# Patient Record
Sex: FEMALE | Race: WHITE | NOT HISPANIC OR LATINO | Employment: FULL TIME | URBAN - METROPOLITAN AREA
[De-identification: names, ages, dates, MRNs, and addresses within clinical notes are randomized per-mention and may not be internally consistent; named-entity substitution may affect disease eponyms.]

---

## 2023-02-23 LAB
ALANINE AMINOTRANSFERASE (SGPT) (U/L) IN SER/PLAS: 18 U/L (ref 7–45)
ALBUMIN (G/DL) IN SER/PLAS: 4.4 G/DL (ref 3.4–5)
ALKALINE PHOSPHATASE (U/L) IN SER/PLAS: 55 U/L (ref 33–110)
ANION GAP IN SER/PLAS: 12 MMOL/L (ref 10–20)
ASPARTATE AMINOTRANSFERASE (SGOT) (U/L) IN SER/PLAS: 21 U/L (ref 9–39)
BASOPHILS (10*3/UL) IN BLOOD BY AUTOMATED COUNT: 0.04 X10E9/L (ref 0–0.1)
BASOPHILS/100 LEUKOCYTES IN BLOOD BY AUTOMATED COUNT: 1 % (ref 0–2)
BILIRUBIN TOTAL (MG/DL) IN SER/PLAS: 0.4 MG/DL (ref 0–1.2)
CALCIDIOL (25 OH VITAMIN D3) (NG/ML) IN SER/PLAS: 37 NG/ML
CALCIUM (MG/DL) IN SER/PLAS: 9.2 MG/DL (ref 8.6–10.3)
CARBON DIOXIDE, TOTAL (MMOL/L) IN SER/PLAS: 28 MMOL/L (ref 21–32)
CHLORIDE (MMOL/L) IN SER/PLAS: 105 MMOL/L (ref 98–107)
CHOLESTEROL (MG/DL) IN SER/PLAS: 196 MG/DL (ref 0–199)
CHOLESTEROL IN HDL (MG/DL) IN SER/PLAS: 60.1 MG/DL
CHOLESTEROL/HDL RATIO: 3.3
CREATININE (MG/DL) IN SER/PLAS: 1.08 MG/DL (ref 0.5–1.05)
EOSINOPHILS (10*3/UL) IN BLOOD BY AUTOMATED COUNT: 0.07 X10E9/L (ref 0–0.7)
EOSINOPHILS/100 LEUKOCYTES IN BLOOD BY AUTOMATED COUNT: 1.8 % (ref 0–6)
ERYTHROCYTE DISTRIBUTION WIDTH (RATIO) BY AUTOMATED COUNT: 11.8 % (ref 11.5–14.5)
ERYTHROCYTE MEAN CORPUSCULAR HEMOGLOBIN CONCENTRATION (G/DL) BY AUTOMATED: 33.7 G/DL (ref 32–36)
ERYTHROCYTE MEAN CORPUSCULAR VOLUME (FL) BY AUTOMATED COUNT: 94 FL (ref 80–100)
ERYTHROCYTES (10*6/UL) IN BLOOD BY AUTOMATED COUNT: 4.32 X10E12/L (ref 4–5.2)
GFR FEMALE: 59 ML/MIN/1.73M2
GLUCOSE (MG/DL) IN SER/PLAS: 91 MG/DL (ref 74–99)
HEMATOCRIT (%) IN BLOOD BY AUTOMATED COUNT: 40.7 % (ref 36–46)
HEMOGLOBIN (G/DL) IN BLOOD: 13.7 G/DL (ref 12–16)
IMMATURE GRANULOCYTES/100 LEUKOCYTES IN BLOOD BY AUTOMATED COUNT: 0.3 % (ref 0–0.9)
LDL: 115 MG/DL (ref 0–99)
LEUKOCYTES (10*3/UL) IN BLOOD BY AUTOMATED COUNT: 3.8 X10E9/L (ref 4.4–11.3)
LYMPHOCYTES (10*3/UL) IN BLOOD BY AUTOMATED COUNT: 1.37 X10E9/L (ref 1.2–4.8)
LYMPHOCYTES/100 LEUKOCYTES IN BLOOD BY AUTOMATED COUNT: 35.9 % (ref 13–44)
MONOCYTES (10*3/UL) IN BLOOD BY AUTOMATED COUNT: 0.27 X10E9/L (ref 0.1–1)
MONOCYTES/100 LEUKOCYTES IN BLOOD BY AUTOMATED COUNT: 7.1 % (ref 2–10)
NEUTROPHILS (10*3/UL) IN BLOOD BY AUTOMATED COUNT: 2.06 X10E9/L (ref 1.2–7.7)
NEUTROPHILS/100 LEUKOCYTES IN BLOOD BY AUTOMATED COUNT: 53.9 % (ref 40–80)
PLATELETS (10*3/UL) IN BLOOD AUTOMATED COUNT: 263 X10E9/L (ref 150–450)
POTASSIUM (MMOL/L) IN SER/PLAS: 4.1 MMOL/L (ref 3.5–5.3)
PROTEIN TOTAL: 7.1 G/DL (ref 6.4–8.2)
SODIUM (MMOL/L) IN SER/PLAS: 141 MMOL/L (ref 136–145)
THYROTROPIN (MIU/L) IN SER/PLAS BY DETECTION LIMIT <= 0.05 MIU/L: 2.54 MIU/L (ref 0.44–3.98)
THYROXINE (T4) (UG/DL) IN SER/PLAS: 8.6 UG/DL (ref 4.5–11.1)
THYROXINE (T4) FREE INDEX IN SER/PLAS BY CALCULATION: 3.3 (ref 1.6–4.7)
TRIGLYCERIDE (MG/DL) IN SER/PLAS: 103 MG/DL (ref 0–149)
TRIIODOTHYRONINE RESIN UPTAKE (T3RU) % IN SER/PLAS: 38 % (ref 24–41)
UREA NITROGEN (MG/DL) IN SER/PLAS: 17 MG/DL (ref 6–23)
VLDL: 21 MG/DL (ref 0–40)

## 2023-05-11 DIAGNOSIS — E78.2 MIXED HYPERLIPIDEMIA: ICD-10-CM

## 2023-05-11 PROBLEM — E78.5 HYPERLIPIDEMIA: Status: ACTIVE | Noted: 2023-05-11

## 2023-05-11 RX ORDER — EZETIMIBE 10 MG/1
10 TABLET ORAL DAILY
Qty: 90 TABLET | Refills: 1 | Status: SHIPPED | OUTPATIENT
Start: 2023-05-11 | End: 2024-01-23

## 2023-05-11 RX ORDER — EZETIMIBE 10 MG/1
10 TABLET ORAL DAILY
COMMUNITY
End: 2023-05-11 | Stop reason: SDUPTHER

## 2023-06-23 ENCOUNTER — TELEPHONE (OUTPATIENT)
Dept: PRIMARY CARE | Facility: CLINIC | Age: 58
End: 2023-06-23

## 2023-06-23 ENCOUNTER — TELEMEDICINE (OUTPATIENT)
Dept: PRIMARY CARE | Facility: CLINIC | Age: 58
End: 2023-06-23
Payer: COMMERCIAL

## 2023-06-23 DIAGNOSIS — E78.2 MIXED HYPERLIPIDEMIA: ICD-10-CM

## 2023-06-23 DIAGNOSIS — J40 BRONCHITIS: Primary | ICD-10-CM

## 2023-06-23 DIAGNOSIS — U07.1 COVID-19: ICD-10-CM

## 2023-06-23 PROBLEM — M20.40 HAMMERTOE: Status: ACTIVE | Noted: 2023-06-23

## 2023-06-23 PROBLEM — E03.9 HYPOTHYROIDISM: Status: ACTIVE | Noted: 2023-06-23

## 2023-06-23 PROBLEM — M89.8X1 PAIN OF RIGHT CLAVICLE: Status: ACTIVE | Noted: 2023-06-23

## 2023-06-23 PROBLEM — M20.12 ACQUIRED HALLUX VALGUS OF LEFT FOOT: Status: ACTIVE | Noted: 2023-06-23

## 2023-06-23 PROCEDURE — 99214 OFFICE O/P EST MOD 30 MIN: CPT | Performed by: FAMILY MEDICINE

## 2023-06-23 RX ORDER — LEVOTHYROXINE SODIUM 75 UG/1
75 TABLET ORAL DAILY
COMMUNITY
End: 2023-08-22 | Stop reason: SDUPTHER

## 2023-06-23 ASSESSMENT — ENCOUNTER SYMPTOMS
FEVER: 1
CLAUDICATION: 0
VOMITING: 0
SORE THROAT: 1
SHORTNESS OF BREATH: 1
ORTHOPNEA: 0
SWOLLEN GLANDS: 0
HEADACHES: 1
ABDOMINAL PAIN: 0
LEG PAIN: 0
PND: 0
RHINORRHEA: 1
HEMOPTYSIS: 0
NECK PAIN: 0
SYNCOPE: 0
SPUTUM PRODUCTION: 1
WHEEZING: 1

## 2023-06-23 NOTE — PROGRESS NOTES
Subjective   Patient ID: Melissa Chung is a 57 y.o. female who presents for SARS COVID.    Shortness of Breath  This is a new problem. The current episode started yesterday. The problem occurs intermittently. The problem has been waxing and waning. The average episode lasts 3 minutes. Associated symptoms include coryza, a fever, headaches, rhinorrhea, a sore throat, sputum production and wheezing. Pertinent negatives include no abdominal pain, chest pain, claudication, ear pain, hemoptysis, leg pain, leg swelling, neck pain, orthopnea, PND, rash, swollen glands, syncope or vomiting. The symptoms are aggravated by URIs. The patient has no known risk factors for DVT/PE. She has tried OTC cough suppressants for the symptoms.    Patient called in office because her and her  tested positive for COVID. Patient claims stuffy nose, cough, no taste and no smell, chills, fever, sore throat. Patient did try OTC meds with little relief.    Review of Systems   Constitutional:  Positive for fever.   HENT:  Positive for rhinorrhea and sore throat. Negative for ear pain.    Respiratory:  Positive for sputum production, shortness of breath and wheezing. Negative for hemoptysis.    Cardiovascular:  Negative for chest pain, orthopnea, claudication, leg swelling, syncope and PND.   Gastrointestinal:  Negative for abdominal pain and vomiting.   Musculoskeletal:  Negative for neck pain.   Skin:  Negative for rash.   Neurological:  Positive for headaches.       Objective   There were no vitals taken for this visit.    Physical Exam  Constitutional: Well developed, well nourished, alert and in no acute distress     Assessment/Plan   Problem List Items Addressed This Visit       Hyperlipidemia    Relevant Orders    Follow Up In [facility] - PCP    COVID-19    Relevant Medications    nirmatrelvir-ritonavir (PAXLOVID) 300 mg (150 mg x 2)-100 mg tablet therapy pack    Other Relevant Orders    [facility]  - PCP    Bronchitis - Primary    Relevant Medications    nirmatrelvir-ritonavir (PAXLOVID) 300 mg (150 mg x 2)-100 mg tablet therapy pack    Other Relevant Orders    Follow [facility] - PCP     See visit 2/15    Quarantine x 5 days    FU NTI 1 week    Virtual Visit - Audio and Visual Communication Real Time     Continue current medications and therapy for chronic medical conditions

## 2023-06-23 NOTE — TELEPHONE ENCOUNTER
PT HAS TESTED POSITIVE FOR COVID WITH AN AT HOME TEST AND WOULD LIKE TO KNOW IF HER AND HER  COULD GET PAXLOVID CALLED INTO THEIR LOCAL PHARMACY.      [facility] [place]

## 2023-08-22 ENCOUNTER — TELEPHONE (OUTPATIENT)
Dept: PRIMARY CARE | Facility: CLINIC | Age: 58
End: 2023-08-22
Payer: COMMERCIAL

## 2023-08-22 DIAGNOSIS — E78.2 MIXED HYPERLIPIDEMIA: ICD-10-CM

## 2023-08-23 RX ORDER — LEVOTHYROXINE SODIUM 75 UG/1
75 TABLET ORAL DAILY
Qty: 90 TABLET | Refills: 1 | Status: SHIPPED | OUTPATIENT
Start: 2023-08-23 | End: 2024-01-23

## 2024-01-23 ENCOUNTER — TELEPHONE (OUTPATIENT)
Dept: PRIMARY CARE | Facility: CLINIC | Age: 59
End: 2024-01-23
Payer: COMMERCIAL

## 2024-01-23 DIAGNOSIS — U07.1 COVID-19: ICD-10-CM

## 2024-01-23 DIAGNOSIS — E78.2 MIXED HYPERLIPIDEMIA: ICD-10-CM

## 2024-01-23 RX ORDER — NIRMATRELVIR AND RITONAVIR 300-100 MG
3 KIT ORAL 2 TIMES DAILY
Qty: 30 TABLET | Refills: 0 | Status: SHIPPED | OUTPATIENT
Start: 2024-01-23 | End: 2024-01-23 | Stop reason: SDUPTHER

## 2024-01-23 RX ORDER — LEVOTHYROXINE SODIUM 75 UG/1
75 TABLET ORAL DAILY
Qty: 90 TABLET | Refills: 1 | Status: SHIPPED | OUTPATIENT
Start: 2024-01-23

## 2024-01-23 RX ORDER — NIRMATRELVIR AND RITONAVIR 300-100 MG
3 KIT ORAL 2 TIMES DAILY
Qty: 30 TABLET | Refills: 0 | Status: SHIPPED | OUTPATIENT
Start: 2024-01-23 | End: 2024-01-28

## 2024-01-23 RX ORDER — EZETIMIBE 10 MG/1
10 TABLET ORAL DAILY
Qty: 90 TABLET | Refills: 1 | Status: SHIPPED | OUTPATIENT
Start: 2024-01-23

## 2024-01-23 NOTE — TELEPHONE ENCOUNTER
Pt tested positive for covid. Symptoms for 1.5 days. She is requesting paxlovid. [facility] [address] in [place]    Current SX ough, sore throat, headache, fatigue, body aches, no appetite

## 2024-01-24 RX ORDER — NIRMATRELVIR AND RITONAVIR 300-100 MG
3 KIT ORAL 2 TIMES DAILY
Qty: 30 TABLET | Refills: 0 | OUTPATIENT
Start: 2024-01-24 | End: 2024-01-29

## 2024-01-25 DIAGNOSIS — J40 BRONCHITIS: ICD-10-CM

## 2024-01-25 RX ORDER — CODEINE PHOSPHATE AND GUAIFENESIN 10; 100 MG/5ML; MG/5ML
5 SOLUTION ORAL EVERY 6 HOURS PRN
Qty: 120 ML | Refills: 0 | Status: SHIPPED | OUTPATIENT
Start: 2024-01-25 | End: 2024-03-18 | Stop reason: ALTCHOICE

## 2024-01-25 RX ORDER — CEFDINIR 300 MG/1
300 CAPSULE ORAL 2 TIMES DAILY
Qty: 20 CAPSULE | Refills: 0 | Status: SHIPPED | OUTPATIENT
Start: 2024-01-25 | End: 2024-02-04

## 2024-01-25 RX ORDER — PREDNISONE 10 MG/1
TABLET ORAL
Qty: 30 TABLET | Refills: 0 | Status: SHIPPED | OUTPATIENT
Start: 2024-01-25 | End: 2024-03-18 | Stop reason: ALTCHOICE

## 2024-01-25 NOTE — TELEPHONE ENCOUNTER
PT IS HAVING SHORTNESS OF BREATH AND IS HAVING TROUBLE BREATHING, PT ASKED FOR AN XRAY ORDER FROM [facility] BUT I INFORMED PT IT WOULD BE EFFECTIVE TO GO TO AN ER WITH HER SYMPTOMS.

## 2024-01-29 DIAGNOSIS — J40 BRONCHITIS: Primary | ICD-10-CM

## 2024-01-29 RX ORDER — ALBUTEROL SULFATE 90 UG/1
2 AEROSOL, METERED RESPIRATORY (INHALATION) EVERY 6 HOURS PRN
Qty: 18 G | Refills: 2 | Status: SHIPPED | OUTPATIENT
Start: 2024-01-29 | End: 2024-03-18 | Stop reason: WASHOUT

## 2024-03-18 ENCOUNTER — OFFICE VISIT (OUTPATIENT)
Dept: PRIMARY CARE | Facility: CLINIC | Age: 59
End: 2024-03-18
Payer: COMMERCIAL

## 2024-03-18 VITALS
BODY MASS INDEX: 27.9 KG/M2 | HEART RATE: 58 BPM | SYSTOLIC BLOOD PRESSURE: 126 MMHG | HEIGHT: 64 IN | OXYGEN SATURATION: 98 % | WEIGHT: 163.4 LBS | DIASTOLIC BLOOD PRESSURE: 60 MMHG

## 2024-03-18 DIAGNOSIS — Z12.31 ENCOUNTER FOR SCREENING MAMMOGRAM FOR MALIGNANT NEOPLASM OF BREAST: ICD-10-CM

## 2024-03-18 DIAGNOSIS — E03.9 ACQUIRED HYPOTHYROIDISM: ICD-10-CM

## 2024-03-18 DIAGNOSIS — E78.5 DYSLIPIDEMIA: ICD-10-CM

## 2024-03-18 DIAGNOSIS — Z00.00 HEALTHCARE MAINTENANCE: Primary | ICD-10-CM

## 2024-03-18 DIAGNOSIS — R53.83 FATIGUE, UNSPECIFIED TYPE: ICD-10-CM

## 2024-03-18 PROCEDURE — 99396 PREV VISIT EST AGE 40-64: CPT | Performed by: FAMILY MEDICINE

## 2024-03-18 PROCEDURE — 1036F TOBACCO NON-USER: CPT | Performed by: FAMILY MEDICINE

## 2024-03-18 ASSESSMENT — ENCOUNTER SYMPTOMS
DYSPHORIC MOOD: 0
PHOTOPHOBIA: 0
SINUS PAIN: 0
CONSTITUTIONAL NEGATIVE: 1
DIZZINESS: 0
CONFUSION: 0
SPEECH DIFFICULTY: 0
DIARRHEA: 0
NERVOUS/ANXIOUS: 0
POLYPHAGIA: 0
ARTHRALGIAS: 0
APPETITE CHANGE: 0
ABDOMINAL PAIN: 0
COUGH: 0
CHEST TIGHTNESS: 0
DYSURIA: 0
SEIZURES: 0
AGITATION: 0
RHINORRHEA: 0
COLOR CHANGE: 0
ACTIVITY CHANGE: 0
RECTAL PAIN: 0
FEVER: 0
FATIGUE: 0
MYALGIAS: 0
SLEEP DISTURBANCE: 0
DECREASED CONCENTRATION: 0
CONSTIPATION: 0
POLYDIPSIA: 0
SORE THROAT: 0
FLANK PAIN: 0
NECK STIFFNESS: 0
TROUBLE SWALLOWING: 0
SHORTNESS OF BREATH: 0
SINUS PRESSURE: 0
STRIDOR: 0
HEADACHES: 0
ADENOPATHY: 0
ABDOMINAL DISTENTION: 0
PALPITATIONS: 0
HEMATURIA: 0
EYE PAIN: 0
BLOOD IN STOOL: 0

## 2024-03-18 NOTE — PROGRESS NOTES
"Subjective   Patient ID: Melissa Chung is a 58 y.o. female who presents for Annual Exam.    Patient Is here for her annual exam    Patient had covid in January and feels she is still having lingering effects due to having a cough and chronic extreme fatigue.     Denies any other concerns          Review of Systems   Constitutional: Negative.  Negative for activity change, appetite change, fatigue and fever.   HENT:  Negative for congestion, dental problem, ear discharge, ear pain, mouth sores, rhinorrhea, sinus pressure, sinus pain, sore throat, tinnitus and trouble swallowing.    Eyes:  Negative for photophobia, pain and visual disturbance.   Respiratory:  Negative for cough, chest tightness, shortness of breath and stridor.    Cardiovascular:  Negative for chest pain and palpitations.   Gastrointestinal:  Negative for abdominal distention, abdominal pain, blood in stool, constipation, diarrhea and rectal pain.   Endocrine: Negative for cold intolerance, heat intolerance, polydipsia, polyphagia and polyuria.   Genitourinary:  Negative for dysuria, flank pain, hematuria and urgency.   Musculoskeletal:  Negative for arthralgias, gait problem, myalgias and neck stiffness.   Skin:  Negative for color change and rash.   Allergic/Immunologic: Negative for environmental allergies and food allergies.   Neurological:  Negative for dizziness, seizures, syncope, speech difficulty and headaches.   Hematological:  Negative for adenopathy.   Psychiatric/Behavioral:  Negative for agitation, confusion, decreased concentration, dysphoric mood and sleep disturbance. The patient is not nervous/anxious.        Objective   /60   Pulse 58   Ht 1.626 m (5' 4\")   Wt 74.1 kg (163 lb 6.4 oz)   SpO2 98%   BMI 28.05 kg/m²     Physical Exam  Constitutional: Well developed, well nourished, alert and in no acute distress   Eyes: Normal external exam. Pupils equally round and reactive to light with normal accommodation and extraocular " movements intact.  Neck: Supple, no lymphadenopathy or masses.   Cardiovascular: Regular rate and rhythm, normal S1 and S2, no murmurs, gallops, or rubs. Radial pulses normal. No peripheral edema.  Pulmonary: No respiratory distress, lungs clear to auscultation bilaterally. No wheezes, rhonchi, rales.  Abdomen: soft,non tender, non distended, without masses or HSM  Skin: Warm, well perfused, normal skin turgor and color.   Neurologic: Cranial nerves II-XII grossly intact.   Psychiatric: Mood calm and affect normal  Musculoskeletal: Moving all extremities without restriction    Assessment/Plan   Problem List Items Addressed This Visit             ICD-10-CM    Hypothyroidism E03.9    Relevant Orders    Thyroid Stimulating Hormone    Free T4 Index    [facility] - PCP - Established    Follow [facility] - PCP - Established     Other Visit Diagnoses         Codes    Dyslipidemia    -  Primary E78.5    Relevant Orders    Comprehensive Metabolic Panel    Lipid Panel    Follow [facility] - PCP - Established    Follow Up In [facility] - PCP - Established    Fatigue, unspecified type     R53.83    Relevant Orders    CBC and Auto Differential    Thyroid Stimulating Hormone    Vitamin D 25-Hydroxy,Total (for eval of Vitamin D levels)    Vitamin B12    Iron and TIBC    Free T4 Index    Follow [facility] In [facility] - PCP - Established    Follow Up In [facility] - PCP - Established    Healthcare maintenance     Z00.00    Relevant Orders    CBC and Auto Differential    Comprehensive Metabolic Panel    Lipid Panel    Thyroid Stimulating Hormone    Vitamin D 25-Hydroxy,Total (for eval of Vitamin D levels)    Vitamin B12    Iron and TIBC    Free T4 Index    [facility] - PCP - Established    Follow [facility] - PCP - Established    Encounter for screening mammogram for malignant neoplasm of breast     Z12.31     Relevant Orders    BI mammo bilateral screening tomosynthesis          Scribe Attestation  By signing my name below, I, Brent Orosco MD , Scribe   attest that this documentation has been prepared under the direction and in the presence of Brent Orosco MD.    Provider Attestation - Scribe documentation    All medical record entries made by the Scribe were at my direction and personally dictated by me. I have reviewed the chart and agree that the record accurately reflects my personal performance of the history, physical exam, discussion and plan.     Blood work ordered today    Mammog now    bone density 2025     colonoscopy due 8/2024

## 2024-03-20 ENCOUNTER — LAB (OUTPATIENT)
Dept: LAB | Facility: LAB | Age: 59
End: 2024-03-20
Payer: COMMERCIAL

## 2024-03-20 DIAGNOSIS — E03.9 ACQUIRED HYPOTHYROIDISM: ICD-10-CM

## 2024-03-20 DIAGNOSIS — Z00.00 HEALTHCARE MAINTENANCE: ICD-10-CM

## 2024-03-20 DIAGNOSIS — E78.5 DYSLIPIDEMIA: ICD-10-CM

## 2024-03-20 DIAGNOSIS — R53.83 FATIGUE, UNSPECIFIED TYPE: ICD-10-CM

## 2024-03-20 LAB
25(OH)D3 SERPL-MCNC: 21 NG/ML (ref 30–100)
ALBUMIN SERPL BCP-MCNC: 4 G/DL (ref 3.4–5)
ALP SERPL-CCNC: 63 U/L (ref 33–110)
ALT SERPL W P-5'-P-CCNC: 20 U/L (ref 7–45)
ANION GAP SERPL CALC-SCNC: 11 MMOL/L (ref 10–20)
AST SERPL W P-5'-P-CCNC: 21 U/L (ref 9–39)
BASOPHILS # BLD AUTO: 0.11 X10*3/UL (ref 0–0.1)
BASOPHILS NFR BLD AUTO: 1.9 %
BILIRUB SERPL-MCNC: 0.4 MG/DL (ref 0–1.2)
BUN SERPL-MCNC: 12 MG/DL (ref 6–23)
CALCIUM SERPL-MCNC: 9.4 MG/DL (ref 8.6–10.3)
CHLORIDE SERPL-SCNC: 106 MMOL/L (ref 98–107)
CHOLEST SERPL-MCNC: 205 MG/DL (ref 0–199)
CHOLESTEROL/HDL RATIO: 5.1
CO2 SERPL-SCNC: 27 MMOL/L (ref 21–32)
CREAT SERPL-MCNC: 0.95 MG/DL (ref 0.5–1.05)
EGFRCR SERPLBLD CKD-EPI 2021: 69 ML/MIN/1.73M*2
EOSINOPHIL # BLD AUTO: 0.28 X10*3/UL (ref 0–0.7)
EOSINOPHIL NFR BLD AUTO: 4.9 %
ERYTHROCYTE [DISTWIDTH] IN BLOOD BY AUTOMATED COUNT: 12.8 % (ref 11.5–14.5)
FT4I SERPL CALC-MCNC: 3.9 (ref 1.6–4.7)
GLUCOSE SERPL-MCNC: 88 MG/DL (ref 74–99)
HCT VFR BLD AUTO: 39.9 % (ref 36–46)
HDLC SERPL-MCNC: 40.2 MG/DL
HGB BLD-MCNC: 13.1 G/DL (ref 12–16)
IMM GRANULOCYTES # BLD AUTO: 0.03 X10*3/UL (ref 0–0.7)
IMM GRANULOCYTES NFR BLD AUTO: 0.5 % (ref 0–0.9)
IRON SATN MFR SERPL: 18 % (ref 25–45)
IRON SERPL-MCNC: 64 UG/DL (ref 35–150)
LDLC SERPL CALC-MCNC: 101 MG/DL
LYMPHOCYTES # BLD AUTO: 1.23 X10*3/UL (ref 1.2–4.8)
LYMPHOCYTES NFR BLD AUTO: 21.7 %
MCH RBC QN AUTO: 30.7 PG (ref 26–34)
MCHC RBC AUTO-ENTMCNC: 32.8 G/DL (ref 32–36)
MCV RBC AUTO: 93 FL (ref 80–100)
MONOCYTES # BLD AUTO: 0.62 X10*3/UL (ref 0.1–1)
MONOCYTES NFR BLD AUTO: 10.9 %
NEUTROPHILS # BLD AUTO: 3.41 X10*3/UL (ref 1.2–7.7)
NEUTROPHILS NFR BLD AUTO: 60.1 %
NON HDL CHOLESTEROL: 165 MG/DL (ref 0–149)
NRBC BLD-RTO: 0 /100 WBCS (ref 0–0)
PLATELET # BLD AUTO: 313 X10*3/UL (ref 150–450)
POTASSIUM SERPL-SCNC: 4.4 MMOL/L (ref 3.5–5.3)
PROT SERPL-MCNC: 6.8 G/DL (ref 6.4–8.2)
RBC # BLD AUTO: 4.27 X10*6/UL (ref 4–5.2)
SODIUM SERPL-SCNC: 140 MMOL/L (ref 136–145)
T3RU NFR SERPL: 36 % (ref 24–41)
T4 SERPL-MCNC: 10.8 UG/DL (ref 4.5–11.1)
TIBC SERPL-MCNC: 363 UG/DL (ref 240–445)
TRIGL SERPL-MCNC: 318 MG/DL (ref 0–149)
TSH SERPL-ACNC: 1.61 MIU/L (ref 0.44–3.98)
UIBC SERPL-MCNC: 299 UG/DL (ref 110–370)
VIT B12 SERPL-MCNC: 710 PG/ML (ref 211–911)
VLDL: 64 MG/DL (ref 0–40)
WBC # BLD AUTO: 5.7 X10*3/UL (ref 4.4–11.3)

## 2024-03-20 PROCEDURE — 80061 LIPID PANEL: CPT

## 2024-03-20 PROCEDURE — 85025 COMPLETE CBC W/AUTO DIFF WBC: CPT

## 2024-03-20 PROCEDURE — 83540 ASSAY OF IRON: CPT

## 2024-03-20 PROCEDURE — 82607 VITAMIN B-12: CPT

## 2024-03-20 PROCEDURE — 80053 COMPREHEN METABOLIC PANEL: CPT

## 2024-03-20 PROCEDURE — 82306 VITAMIN D 25 HYDROXY: CPT

## 2024-03-20 PROCEDURE — 84443 ASSAY THYROID STIM HORMONE: CPT

## 2024-03-20 PROCEDURE — 83550 IRON BINDING TEST: CPT

## 2024-03-20 PROCEDURE — 84479 ASSAY OF THYROID (T3 OR T4): CPT

## 2024-03-20 PROCEDURE — 84436 ASSAY OF TOTAL THYROXINE: CPT

## 2024-03-20 PROCEDURE — 36415 COLL VENOUS BLD VENIPUNCTURE: CPT

## 2024-03-24 DIAGNOSIS — R05.3 CHRONIC COUGH: Primary | ICD-10-CM

## 2024-03-24 DIAGNOSIS — E55.9 VITAMIN D DEFICIENCY: ICD-10-CM

## 2024-03-24 DIAGNOSIS — M25.50 ARTHRALGIA, UNSPECIFIED JOINT: ICD-10-CM

## 2024-03-24 RX ORDER — CHOLECALCIFEROL (VITAMIN D3) 50 MCG
50 TABLET ORAL DAILY
Qty: 90 TABLET | Refills: 1 | Status: SHIPPED | OUTPATIENT
Start: 2024-03-24 | End: 2025-03-24

## 2024-03-25 ENCOUNTER — HOSPITAL ENCOUNTER (OUTPATIENT)
Dept: RADIOLOGY | Facility: CLINIC | Age: 59
Discharge: HOME | End: 2024-03-25
Payer: COMMERCIAL

## 2024-03-25 ENCOUNTER — LAB (OUTPATIENT)
Dept: LAB | Facility: LAB | Age: 59
End: 2024-03-25
Payer: COMMERCIAL

## 2024-03-25 DIAGNOSIS — M25.50 ARTHRALGIA, UNSPECIFIED JOINT: ICD-10-CM

## 2024-03-25 DIAGNOSIS — R05.3 CHRONIC COUGH: ICD-10-CM

## 2024-03-25 LAB
CCP IGG SERPL-ACNC: <1 U/ML
CRP SERPL-MCNC: 0.19 MG/DL
ERYTHROCYTE [SEDIMENTATION RATE] IN BLOOD BY WESTERGREN METHOD: 5 MM/H (ref 0–30)
RHEUMATOID FACT SER NEPH-ACNC: <10 IU/ML (ref 0–15)

## 2024-03-25 PROCEDURE — 86200 CCP ANTIBODY: CPT

## 2024-03-25 PROCEDURE — 86431 RHEUMATOID FACTOR QUANT: CPT

## 2024-03-25 PROCEDURE — 71046 X-RAY EXAM CHEST 2 VIEWS: CPT | Performed by: RADIOLOGY

## 2024-03-25 PROCEDURE — 86140 C-REACTIVE PROTEIN: CPT

## 2024-03-25 PROCEDURE — 85652 RBC SED RATE AUTOMATED: CPT

## 2024-03-25 PROCEDURE — 36415 COLL VENOUS BLD VENIPUNCTURE: CPT

## 2024-03-25 PROCEDURE — 71046 X-RAY EXAM CHEST 2 VIEWS: CPT

## 2024-03-25 PROCEDURE — 86038 ANTINUCLEAR ANTIBODIES: CPT

## 2024-03-26 LAB — ANA SER QL HEP2 SUBST: NEGATIVE

## 2024-04-01 ENCOUNTER — APPOINTMENT (OUTPATIENT)
Dept: RADIOLOGY | Facility: CLINIC | Age: 59
End: 2024-04-01
Payer: COMMERCIAL

## 2024-04-02 ENCOUNTER — APPOINTMENT (OUTPATIENT)
Dept: RADIOLOGY | Facility: CLINIC | Age: 59
End: 2024-04-02
Payer: COMMERCIAL

## 2024-04-04 ENCOUNTER — APPOINTMENT (OUTPATIENT)
Dept: RADIOLOGY | Facility: CLINIC | Age: 59
End: 2024-04-04
Payer: COMMERCIAL

## 2024-04-04 DIAGNOSIS — J40 BRONCHITIS: ICD-10-CM

## 2024-04-04 RX ORDER — ALBUTEROL SULFATE 90 UG/1
2 AEROSOL, METERED RESPIRATORY (INHALATION) EVERY 6 HOURS PRN
Qty: 18 G | Refills: 2 | Status: SHIPPED | OUTPATIENT
Start: 2024-04-04 | End: 2025-04-04

## 2024-04-05 ENCOUNTER — APPOINTMENT (OUTPATIENT)
Dept: RADIOLOGY | Facility: CLINIC | Age: 59
End: 2024-04-05
Payer: COMMERCIAL

## 2024-04-05 DIAGNOSIS — J40 BRONCHITIS: Primary | ICD-10-CM

## 2024-04-05 RX ORDER — LEVOFLOXACIN 500 MG/1
500 TABLET, FILM COATED ORAL DAILY
Qty: 10 TABLET | Refills: 0 | Status: SHIPPED | OUTPATIENT
Start: 2024-04-05 | End: 2024-04-15

## 2024-04-09 ENCOUNTER — HOSPITAL ENCOUNTER (OUTPATIENT)
Dept: RADIOLOGY | Facility: CLINIC | Age: 59
Discharge: HOME | End: 2024-04-09
Payer: COMMERCIAL

## 2024-04-09 VITALS — WEIGHT: 155 LBS | BODY MASS INDEX: 26.46 KG/M2 | HEIGHT: 64 IN

## 2024-04-09 DIAGNOSIS — Z12.31 ENCOUNTER FOR SCREENING MAMMOGRAM FOR MALIGNANT NEOPLASM OF BREAST: ICD-10-CM

## 2024-04-09 PROCEDURE — 77067 SCR MAMMO BI INCL CAD: CPT

## 2024-04-09 PROCEDURE — 77063 BREAST TOMOSYNTHESIS BI: CPT | Performed by: RADIOLOGY

## 2024-04-09 PROCEDURE — 77067 SCR MAMMO BI INCL CAD: CPT | Performed by: RADIOLOGY

## 2024-04-12 ENCOUNTER — OFFICE VISIT (OUTPATIENT)
Dept: PRIMARY CARE | Facility: CLINIC | Age: 59
End: 2024-04-12
Payer: COMMERCIAL

## 2024-04-12 VITALS
DIASTOLIC BLOOD PRESSURE: 78 MMHG | SYSTOLIC BLOOD PRESSURE: 110 MMHG | HEIGHT: 64 IN | BODY MASS INDEX: 27.25 KG/M2 | OXYGEN SATURATION: 96 % | WEIGHT: 159.6 LBS | HEART RATE: 70 BPM

## 2024-04-12 DIAGNOSIS — R05.3 COVID-19 LONG HAULER MANIFESTING CHRONIC COUGH: ICD-10-CM

## 2024-04-12 DIAGNOSIS — U09.9 COVID-19 LONG HAULER MANIFESTING CHRONIC COUGH: ICD-10-CM

## 2024-04-12 DIAGNOSIS — R06.2 WHEEZING: ICD-10-CM

## 2024-04-12 DIAGNOSIS — R05.2 SUBACUTE COUGH: Primary | ICD-10-CM

## 2024-04-12 PROCEDURE — 99214 OFFICE O/P EST MOD 30 MIN: CPT | Performed by: INTERNAL MEDICINE

## 2024-04-12 RX ORDER — BENZONATATE 100 MG/1
100 CAPSULE ORAL 3 TIMES DAILY PRN
Qty: 42 CAPSULE | Refills: 0 | Status: SHIPPED | OUTPATIENT
Start: 2024-04-12 | End: 2024-05-12

## 2024-04-12 RX ORDER — MINERAL OIL
180 ENEMA (ML) RECTAL DAILY
Qty: 30 TABLET | Refills: 5 | Status: SHIPPED | OUTPATIENT
Start: 2024-04-12 | End: 2025-04-12

## 2024-04-12 RX ORDER — FLUTICASONE PROPIONATE 50 MCG
1 SPRAY, SUSPENSION (ML) NASAL DAILY
Qty: 16 G | Refills: 5 | Status: SHIPPED | OUTPATIENT
Start: 2024-04-12 | End: 2025-04-12

## 2024-04-12 NOTE — PROGRESS NOTES
Subjective   Patient ID: [id number]     Melissa Chung is a 58 y.o. female who presents for New Patient Visit and URI (Patient has been sick since January. /Patient had labs done to check for Lupus. //Patient has been experiencing SOB, muscle weakness and joint pain. ).    Current Outpatient Medications:     albuterol 90 mcg/actuation inhaler, Inhale 2 puffs every 6 hours if needed for wheezing., Disp: 18 g, Rfl: 2    cholecalciferol (Vitamin D3) 50 MCG (2000 UT) tablet, Take 1 tablet (50 mcg) by mouth once daily., Disp: 90 tablet, Rfl: 1    ezetimibe (Zetia) 10 mg tablet, Take 1 tablet (10 mg) by mouth once daily., Disp: 90 tablet, Rfl: 1    levoFLOXacin (Levaquin) 500 mg tablet, Take 1 tablet (500 mg) by mouth once daily for 10 days., Disp: 10 tablet, Rfl: 0    levothyroxine (Synthroid, Levoxyl) 75 mcg tablet, TAKE 1 TABLET BY MOUTH EVERY DAY, Disp: 90 tablet, Rfl: 1  HPI  58-year-old patient presented to the office today to establish care and discuss few concerns.  Patient is not due for her annual exam.  Patient was diagnosed with COVID-19 in January 2023 when she came back from a trip to [place] she was given Paxlovid, she improved slightly but after that she started to have a cough.  She received a course of antibiotics improved slightly but then she had another trip to [place] and when she was there she experienced worsening respiratory symptoms as well as increased joint pain stiffness and swelling she noticed the rash.  She has been investigated thoroughly with her previous primary care physician she had workup for autoimmune disease and came back negative markers of inflammation came back negative rheumatoid factor and CCP came back negative patient was reassured.  Right now she tells me that the joint pain in the muscle pain have improved slightly but she still has a cough and postnasal drainage and occasionally she has shortness of breath and wheezing.  Chest x-ray was done no evidence of  "pneumonia.  Review of system was reviewed all normal except what is noted in HPI   Past Medical History:   Diagnosis Date    Other specified health status     Known health problems: none      Objective   /78 (BP Location: Left arm, Patient Position: Sitting)   Pulse 70   Ht 1.626 m (5' 4\")   Wt 72.4 kg (159 lb 9.6 oz)   SpO2 96%   BMI 27.40 kg/m²      Physical Exam  Constitutional:       Appearance: Normal appearance.   Cardiovascular:      Rate and Rhythm: Normal rate and regular rhythm.      Pulses: Normal pulses.      Heart sounds: Normal heart sounds.   Pulmonary:      Effort: Pulmonary effort is normal.      Breath sounds: Normal breath sounds.   Neurological:      Mental Status: She is alert.       Assessment/Plan   Problem List Items Addressed This Visit    None  58-year-old patient with the following issues.    1.  Ongoing postinfectious cough with shortness of breath and wheezing at times at this point I would like to proceed with pulmonary function test and a pulmonary consult.  Patient will be given a prescription for benzonatate for cough control and fexofenadine and nasal spray to control the postnasal drainage we will contact him with results with him available.    2.  Increased joint pain and muscle pain currently improving negative workup including rheumatologic and markers markers of inflammation and autoimmune panel.  Vitamin D is low but she is being replaced and her thyroid function test is normal and she is on supplement.    No other active issues or concerns during this visit I will see the patient back in the office within the next few weeks for follow-up and sooner if needed.    Lise Hernandez MD   "

## 2024-05-07 ENCOUNTER — APPOINTMENT (OUTPATIENT)
Dept: PRIMARY CARE | Facility: CLINIC | Age: 59
End: 2024-05-07
Payer: COMMERCIAL

## 2024-08-12 ENCOUNTER — APPOINTMENT (OUTPATIENT)
Dept: PRIMARY CARE | Facility: CLINIC | Age: 59
End: 2024-08-12
Payer: COMMERCIAL

## 2024-08-12 DIAGNOSIS — R63.5 WEIGHT GAIN: ICD-10-CM

## 2024-08-12 DIAGNOSIS — E55.9 VITAMIN D DEFICIENCY: ICD-10-CM

## 2024-08-12 DIAGNOSIS — E78.2 MIXED HYPERLIPIDEMIA: ICD-10-CM

## 2024-08-12 PROBLEM — L65.9 LOSS OF HAIR: Status: ACTIVE | Noted: 2024-08-12

## 2024-08-12 PROBLEM — R06.09 DYSPNEA ON EXERTION: Status: ACTIVE | Noted: 2024-08-12

## 2024-08-12 PROCEDURE — 99214 OFFICE O/P EST MOD 30 MIN: CPT | Performed by: FAMILY MEDICINE

## 2024-08-12 ASSESSMENT — PATIENT HEALTH QUESTIONNAIRE - PHQ9
2. FEELING DOWN, DEPRESSED OR HOPELESS: NOT AT ALL
1. LITTLE INTEREST OR PLEASURE IN DOING THINGS: NOT AT ALL
SUM OF ALL RESPONSES TO PHQ9 QUESTIONS 1 AND 2: 0

## 2024-08-12 NOTE — PROGRESS NOTES
Subjective   Patient ID: Melissa Chung is a 58 y.o. female who presents for Weight Management.    Pt would like to discuss weight management with PCP. Pt is currently not on any medication for weight.          No other refills.    Review of Systems  12 Systems have been reviewed as follows.  Constitutional: Fever, weight gain, weight loss, appetite change, night sweats, fatigue, chills.  Eyes : blurry, double vision, vision, loss, tearing, redness, pain, sensitivity to light, glaucoma.  Ears, nose, mouth, and throat: Hearing loss, ringing in the ears, ear pain, nasal congestion, nasal drainage, nosebleeds, mouth, throat, irritation tooth problem.  Cardiovascular :chest pain, pressure, heart racing, palpitations, sweating, leg swelling, high or low blood pressure  Pulmonary: Cough, yellow or green sputum, blood and sputum, shortness of breath, wheezing  Gastrointestinal: Nausea, vomiting, diarrhea, constipation, pain, blood in stool, or vomitus, heartburn, difficulty swallowing  Genitourinary: incontinence, abnormal bleeding, abnormal discharge, urinary frequency, urinary hesitancy, pain, impotence sexual problem, infection, urinary retention  Musculoskeletal: Pain, stiffness, joint, redness or warmth, arthritis, back pain, weakness, muscle wasting, sprain or fracture  Neuro: Weight weakness, dizziness, change in voice, change in taste change in vision, change in hearing, loss, or change of sensation, trouble walking, balance problems coordination problems, shaking, speech problem  Endocrine , cold or heat intolerance, blood sugar problem, weight gain or loss missed periods hot flashes, sweats, change in body hair, change in libido, increased thirst, increased urination  Heme/lymph: Swelling, bleeding, problem anemia, bruising, enlarged lymph nodes  Allergic/immunologic: H. plus nasal drip, watery itchy eyes, nasal drainage, immunosuppressed  The above were reviewed and noted negative except as noted in HPI and  Problem List.      Objective   There were no vitals taken for this visit.    Physical Exam  Constitutional: Well developed, well nourished, alert and in no acute distress   Eyes: Normal external exam. Pupils equally round and reactive to light with normal accommodation and extraocular movements intact.  Neck: Supple, no lymphadenopathy or masses.   Cardiovascular: Regular rate and rhythm, normal S1 and S2, no murmurs, gallops, or rubs. Radial pulses normal. No peripheral edema.  Pulmonary: No respiratory distress, lungs clear to auscultation bilaterally. No wheezes, rhonchi, rales.  Abdomen: soft,non tender, non distended, without masses or HSM  Skin: Warm, well perfused, normal skin turgor and color.   Neurologic: Cranial nerves II-XII grossly intact.   Psychiatric: Mood calm and affect normal  Musculoskeletal: Moving all extremities without restriction      Assessment/Plan   Problem List Items Addressed This Visit             ICD-10-CM    Hyperlipidemia E78.5    Relevant Orders    Follow Up In [facility] - PCP - Established    Vitamin D deficiency E55.9    Relevant Orders    Follow Up In [facility] - PCP - Established    Weight gain R63.5    Relevant Orders    Follow [facility] - PCP - Established    Follow [facility] In [facility] - PCP - Established     Virtual Visit - Audio and Visual Communication Real Time        Continue current medications and therapy for chronic medical conditions.    Patient was advised importance of proper diet/nutrition in addition adequate hydration. Patient was encouraged moderate exercise program to include 30 minutes daily for 5 days of the week or 150 minutes weekly. Patient will follow-up with us as scheduled.      Patient stopped Allegra.  Check samples of Ozempic or Wegovy.  Inject into thigh or abdomen.  Call back by Thursday if not contacted about samples.       Scribe Attestation  By signing my name below, I, Brent Orosco MD ,  Scribe   attest that this documentation has been prepared under the direction and in the presence of Brent Orosco MD.      Provider Attestation - Scribe documentation    All medical record entries made by the Scribe were at my direction and personally dictated by me. I have reviewed the chart and agree that the record accurately reflects my personal performance of the history, physical exam, discussion and plan.

## 2024-08-15 ENCOUNTER — TELEPHONE (OUTPATIENT)
Dept: PRIMARY CARE | Facility: CLINIC | Age: 59
End: 2024-08-15
Payer: COMMERCIAL

## 2024-08-15 DIAGNOSIS — Z12.11 SCREENING FOR COLON CANCER: Primary | ICD-10-CM

## 2024-08-15 DIAGNOSIS — R63.5 WEIGHT GAIN: ICD-10-CM

## 2024-08-15 DIAGNOSIS — E78.2 MIXED HYPERLIPIDEMIA: ICD-10-CM

## 2024-08-15 DIAGNOSIS — Z12.11 COLON CANCER SCREENING: ICD-10-CM

## 2024-08-15 DIAGNOSIS — Z12.11 ENCOUNTER FOR COLORECTAL CANCER SCREENING: ICD-10-CM

## 2024-08-15 DIAGNOSIS — Z12.12 ENCOUNTER FOR COLORECTAL CANCER SCREENING: ICD-10-CM

## 2024-08-15 RX ORDER — LEVOTHYROXINE SODIUM 75 UG/1
75 TABLET ORAL DAILY
Qty: 90 TABLET | Refills: 1 | Status: SHIPPED | OUTPATIENT
Start: 2024-08-15

## 2024-08-15 RX ORDER — SEMAGLUTIDE 0.25 MG/.5ML
0.25 INJECTION, SOLUTION SUBCUTANEOUS
Qty: 2 ML | Refills: 0 | COMMUNITY
Start: 2024-08-18 | End: 2024-09-09

## 2024-08-16 RX ORDER — SODIUM, POTASSIUM,MAG SULFATES 17.5-3.13G
1 SOLUTION, RECONSTITUTED, ORAL ORAL 2 TIMES DAILY
Qty: 1 EACH | Refills: 0 | Status: SHIPPED | OUTPATIENT
Start: 2024-08-16 | End: 2024-08-17

## 2024-09-01 DIAGNOSIS — N30.00 ACUTE CYSTITIS WITHOUT HEMATURIA: Primary | ICD-10-CM

## 2024-09-01 RX ORDER — NITROFURANTOIN 25; 75 MG/1; MG/1
100 CAPSULE ORAL 2 TIMES DAILY
Qty: 20 CAPSULE | Refills: 0 | Status: SHIPPED | OUTPATIENT
Start: 2024-09-01 | End: 2024-09-11

## 2024-09-16 DIAGNOSIS — E78.2 MIXED HYPERLIPIDEMIA: ICD-10-CM

## 2024-09-17 RX ORDER — EZETIMIBE 10 MG/1
10 TABLET ORAL DAILY
Qty: 90 TABLET | Refills: 1 | Status: SHIPPED | OUTPATIENT
Start: 2024-09-17

## 2024-10-28 ENCOUNTER — TELEPHONE (OUTPATIENT)
Dept: GASTROENTEROLOGY | Facility: CLINIC | Age: 59
End: 2024-10-28
Payer: COMMERCIAL

## 2024-10-29 ENCOUNTER — ANESTHESIA EVENT (OUTPATIENT)
Dept: GASTROENTEROLOGY | Facility: EXTERNAL LOCATION | Age: 59
End: 2024-10-29

## 2024-11-08 ENCOUNTER — APPOINTMENT (OUTPATIENT)
Dept: GASTROENTEROLOGY | Facility: EXTERNAL LOCATION | Age: 59
End: 2024-11-08
Payer: COMMERCIAL

## 2024-11-08 ENCOUNTER — ANESTHESIA (OUTPATIENT)
Dept: GASTROENTEROLOGY | Facility: EXTERNAL LOCATION | Age: 59
End: 2024-11-08

## 2024-11-08 VITALS
SYSTOLIC BLOOD PRESSURE: 111 MMHG | DIASTOLIC BLOOD PRESSURE: 74 MMHG | BODY MASS INDEX: 27.31 KG/M2 | WEIGHT: 160 LBS | RESPIRATION RATE: 12 BRPM | HEART RATE: 62 BPM | HEIGHT: 64 IN | TEMPERATURE: 97.9 F | OXYGEN SATURATION: 97 %

## 2024-11-08 DIAGNOSIS — Z12.11 SCREENING FOR COLON CANCER: ICD-10-CM

## 2024-11-08 PROCEDURE — 88305 TISSUE EXAM BY PATHOLOGIST: CPT | Performed by: PATHOLOGY

## 2024-11-08 PROCEDURE — 45385 COLONOSCOPY W/LESION REMOVAL: CPT | Performed by: INTERNAL MEDICINE

## 2024-11-08 PROCEDURE — 88305 TISSUE EXAM BY PATHOLOGIST: CPT | Mod: TC,ELYLAB | Performed by: INTERNAL MEDICINE

## 2024-11-08 RX ORDER — ONDANSETRON HYDROCHLORIDE 2 MG/ML
4 INJECTION, SOLUTION INTRAVENOUS ONCE AS NEEDED
Status: DISCONTINUED | OUTPATIENT
Start: 2024-11-08 | End: 2024-11-09 | Stop reason: HOSPADM

## 2024-11-08 RX ORDER — SODIUM CHLORIDE 9 MG/ML
INJECTION, SOLUTION INTRAVENOUS CONTINUOUS PRN
Status: DISCONTINUED | OUTPATIENT
Start: 2024-11-08 | End: 2024-11-08

## 2024-11-08 RX ORDER — PROPOFOL 10 MG/ML
INJECTION, EMULSION INTRAVENOUS AS NEEDED
Status: DISCONTINUED | OUTPATIENT
Start: 2024-11-08 | End: 2024-11-08

## 2024-11-08 RX ORDER — LIDOCAINE HYDROCHLORIDE 20 MG/ML
INJECTION, SOLUTION INFILTRATION; PERINEURAL AS NEEDED
Status: DISCONTINUED | OUTPATIENT
Start: 2024-11-08 | End: 2024-11-08

## 2024-11-08 SDOH — HEALTH STABILITY: MENTAL HEALTH: CURRENT SMOKER: 0

## 2024-11-08 ASSESSMENT — COLUMBIA-SUICIDE SEVERITY RATING SCALE - C-SSRS
1. IN THE PAST MONTH, HAVE YOU WISHED YOU WERE DEAD OR WISHED YOU COULD GO TO SLEEP AND NOT WAKE UP?: NO
6. HAVE YOU EVER DONE ANYTHING, STARTED TO DO ANYTHING, OR PREPARED TO DO ANYTHING TO END YOUR LIFE?: NO
2. HAVE YOU ACTUALLY HAD ANY THOUGHTS OF KILLING YOURSELF?: NO

## 2024-11-08 ASSESSMENT — PAIN SCALES - GENERAL
PAINLEVEL_OUTOF10: 0 - NO PAIN
PAIN_LEVEL: 0
PAINLEVEL_OUTOF10: 0 - NO PAIN

## 2024-11-08 ASSESSMENT — PAIN - FUNCTIONAL ASSESSMENT
PAIN_FUNCTIONAL_ASSESSMENT: 0-10

## 2024-11-08 NOTE — DISCHARGE INSTRUCTIONS
Patient Instructions Post Endoscopy Procedure      The anesthetics, sedatives or narcotics which were given to you today will be acting in your body for the next 24 hours, so you might feel a little sleepy or groggy.  This feeling should slowly wear off. Carefully read and follow the instructions.     You received sedation today:  - Do not drive or operate any machinery or power tools of any kind.   - No alcoholic beverages today, not even beer or wine.  - Do not make any important decisions or sign any legal documents.  - No over the counter medications that contain alcohol or that may cause drowsiness.    While it is common to experience mild to moderate abdominal distention, gas, or belching after your procedure, if any of these symptoms occur following discharge from the GI Lab or within one week of having your procedure, call the [facility] [place] to be advised whether a visit to your nearest Urgent Care or Emergency Department is indicated.  Take this paper with you if you go.   - If you develop an allergic reaction to the medications that were given during your procedure such as difficulty breathing, rash, hives, severe nausea, vomiting or lightheadedness.  - If you experience chest pain, shortness of breath, severe abdominal pain, fevers and chills.  -If you develop signs and symptoms of bleeding such as blood in your spit, if your stools turn black, tarry, or bloody  - If you have not urinated within 8 hours following your procedure.  - If your IV site becomes painful, red, inflamed, or looks infected.    If you received a biopsy/polypectomy the following instructions apply below:  _x_ Do not use non-steroidal medications or anti-coagulants for one week following your procedure. (Examples of these types of medications are: Advil, Arthrotec, Aleve, Coumadin, Ecotrin, Heparin, Ibuprofen, Indocin, Motrin, Naprosyn, Nuprin, Plavix, Vioxx, and Voltarin, or their generic forms.  This list is not  all-inclusive.  Check with your physician or pharmacist before resuming medications.)   _x_ Eat a soft diet today.  Avoid foods that are poorly digested for the next 24 hours.  These foods would include: nuts, beans, lettuce, red meats, and fried foods. Start with liquids and advance your diet as tolerated, gradually work up to eating solids.   __ You can restart your ASA tomorrow  __ You can resume your anticoagulation therapy -     Your physician recommends the additional following instructions:    -You have a contact number available for emergencies. The signs and symptoms of potential delayed complications were discussed with you. You may return to normal activities tomorrow.  -Resume your previous diet or other if specified.  -Continue your present medications.   -We are waiting for your pathology results, if applicable. The results will be available in [employer]. I will send you a message with any recommendations.  -The findings and recommendations have been discussed with you and/or family.  -Please see Medication Reconciliation Form for new medication/medications prescribed.     If you experience any problems or have any questions following discharge from the GI Lab, please call: [phone] from 7 am- 4:30 pm.  In the event of an emergency please go to the closest Emergency Department or call Dr. Marroquin at [phone]  ,

## 2024-11-08 NOTE — ANESTHESIA POSTPROCEDURE EVALUATION
Patient: Melissa Chung    Procedure Summary       Date: 11/08/24 Room / Location: [facility]    Anesthesia Start: 1051 Anesthesia Stop: 1120    Procedure: COLONOSCOPY Diagnosis: Screening for colon cancer    Scheduled Providers: Naomi RICKS MD Responsible Provider: JANAE Johnson    Anesthesia Type: MAC ASA Status: 2            Anesthesia Type: MAC    Vitals Value Taken Time   /72 11/08/24 1128   Temp 36.6 °C (97.9 °F) 11/08/24 1118   Pulse 61 11/08/24 1128   Resp 16 11/08/24 1128   SpO2 96 % 11/08/24 1128       Anesthesia Post Evaluation    Patient location during evaluation: PACU  Patient participation: waiting for patient participation  Level of consciousness: responsive to verbal stimuli  Pain score: 0  Pain management: adequate  Airway patency: patent  Cardiovascular status: blood pressure returned to baseline  Respiratory status: acceptable  Hydration status: acceptable  Postoperative Nausea and Vomiting: none    No notable events documented.

## 2024-11-08 NOTE — ANESTHESIA PREPROCEDURE EVALUATION
Patient: Melissa Chung    Procedure Information       Date/Time: 11/08/24 1110    Scheduled providers: Naomi RICKS MD    Procedure: COLONOSCOPY    Location: [facility]            Relevant Problems   Cardiac   (+) Hyperlipidemia   (+) Pain of right clavicle      Pulmonary   (+) Dyspnea on exertion      Endocrine   (+) Hypothyroidism      ID   (+) COVID-19       Clinical information reviewed:   Tobacco  Allergies  Meds   Med Hx  Surg Hx   Fam Hx  Soc Hx        NPO Detail:  NPO/Void Status  Carbohydrate Drink Given Prior to Surgery? : Y  Date of Last Liquid: 11/08/24  Time of Last Liquid: 0745  Date of Last Solid: 11/07/24  Time of Last Solid: 0730  Last Intake Type: Clear fluids; GI prep  Time of Last Void: 1022         Physical Exam    Airway  Mallampati: II     Cardiovascular - normal exam     Dental - normal exam     Pulmonary - normal exam     Abdominal - normal exam         Anesthesia Plan    History of general anesthesia?: no  History of complications of general anesthesia?: no    ASA 2     MAC     The patient is not a current smoker.    intravenous induction   Anesthetic plan and risks discussed with patient.

## 2024-11-08 NOTE — H&P
Outpatient Hospital Procedure    Patient Profile-Procedures  Initial Info  Patient Demographics  Name Melissa Chung  Date of Birth 1965  MRN [id number]  Address   [address]  [place] [id number]  [place] [address]    Primary Phone Number [phone]  Secondary Phone Number    PCP Brent Orosco    Procedures   Colonoscopy      Indication:  Surveillance - SSA 2021    Primary contact name and number   Extended Emergency Contact Information  Primary Emergency Contact: Jomar Chung  Home Phone: [phone]  Relation: Spouse    General Health  Weight   Vitals:    11/08/24 1019   Weight: 72.6 kg (160 lb)     BMI Body mass index is 27.46 kg/m².    Allergies  Allergies   Allergen Reactions    Statins-Hmg-Coa Reductase Inhibitors Unknown       Past Medical History   Past Medical History:   Diagnosis Date    Hyperlipidemia     Other specified health status     Known health problems: none    Thyroid disease        Provider assessment  Diagnosis  Medication Reviewed - yes  Prior to Admission medications    Medication Sig Start Date End Date Taking? Authorizing Provider   cholecalciferol (Vitamin D3) 50 MCG (2000 UT) tablet Take 1 tablet (50 mcg) by mouth once daily. 3/24/24 3/24/25 Yes Brent Orosco MD   ezetimibe (Zetia) 10 mg tablet Take 1 tablet (10 mg) by mouth once daily. 9/17/24  Yes Brent Orosco MD   levothyroxine (Synthroid, Levoxyl) 75 mcg tablet TAKE 1 TABLET BY MOUTH DAILY 8/15/24  Yes Brent Orosco MD   albuterol 90 mcg/actuation inhaler Inhale 2 puffs every 6 hours if needed for wheezing.  Patient not taking: Reported on 8/12/2024 4/4/24 4/4/25  Brent Orosco MD   fexofenadine (Allegra) 180 mg tablet Take 1 tablet (180 mg) by mouth once daily.  Patient not taking: Reported on 8/12/2024 4/12/24 4/12/25  Lise Hernandez MD   fluticasone (Flonase) 50 mcg/actuation nasal spray Administer 1 spray into each nostril once daily. Shake gently. Before first use, prime pump.  After use, clean tip and replace cap.  Patient not taking: Reported on 8/12/2024 4/12/24 4/12/25  Lise Hernandez MD   semaglutide, weight loss, (Wegovy) 0.25 mg/0.5 mL pen injector Inject 0.25 mg under the skin 1 (one) time per week for 4 doses. 8/18/24 9/9/24  Brent Orosco MD       This is my H&P    Physical Exam  Physical Exam  Constitutional:       Comments: Awake   HENT:      Head: Normocephalic.   Cardiovascular:      Rate and Rhythm: Normal rate and regular rhythm.   Pulmonary:      Effort: Pulmonary effort is normal.      Breath sounds: Normal breath sounds.   Abdominal:      General: Bowel sounds are normal.      Palpations: Abdomen is soft.   Neurological:      Mental Status: She is alert.   Psychiatric:         Mood and Affect: Mood normal.           Oropharyngeal Classification II (hard and soft palate, upper portion of tonsils and uvula visible)  ASA PS Classification 2  Sedation Plan Deep  Procedure Plan - pre-procedural (re)assesment completed by physician:  discharge/transfer patient when discharge criteria met    Naomi Marroquin MD  11/8/2024 10:51 AM

## 2024-11-18 LAB
LABORATORY COMMENT REPORT: NORMAL
PATH REPORT.FINAL DX SPEC: NORMAL
PATH REPORT.GROSS SPEC: NORMAL
PATH REPORT.RELEVANT HX SPEC: NORMAL
PATH REPORT.TOTAL CANCER: NORMAL
RESIDENT REVIEW: NORMAL

## 2024-12-19 ENCOUNTER — TELEPHONE (OUTPATIENT)
Dept: PRIMARY CARE | Facility: CLINIC | Age: 59
End: 2024-12-19
Payer: COMMERCIAL

## 2024-12-19 DIAGNOSIS — E78.2 MIXED HYPERLIPIDEMIA: ICD-10-CM

## 2024-12-19 RX ORDER — LEVOTHYROXINE SODIUM 75 UG/1
75 TABLET ORAL DAILY
Qty: 90 TABLET | Refills: 1 | Status: SHIPPED | OUTPATIENT
Start: 2024-12-19

## 2024-12-19 NOTE — TELEPHONE ENCOUNTER
Dr. Orosco Pt    Refill for  levothyroxine (Synthroid, Levoxyl) 75 mcg tablet      [id number] - [place], [place] - [address]

## 2024-12-19 NOTE — TELEPHONE ENCOUNTER
Recent Visits  Date Type Provider Dept   04/12/24 Office Visit Lise Hernandez MD [id number]   03/18/24 [facility]   Showing recent visits within past 365 days and meeting all other requirements  Future Appointments  No visits were found meeting these conditions.  Showing future appointments within next 90 days and meeting all other requirements

## 2025-01-05 DIAGNOSIS — S39.012A BACK STRAIN, INITIAL ENCOUNTER: Primary | ICD-10-CM

## 2025-01-05 RX ORDER — DICLOFENAC SODIUM 75 MG/1
75 TABLET, DELAYED RELEASE ORAL 2 TIMES DAILY PRN
Qty: 60 TABLET | Refills: 1 | Status: SHIPPED | OUTPATIENT
Start: 2025-01-05 | End: 2025-01-05 | Stop reason: SDUPTHER

## 2025-01-05 RX ORDER — DICLOFENAC SODIUM 75 MG/1
75 TABLET, DELAYED RELEASE ORAL 2 TIMES DAILY PRN
Qty: 60 TABLET | Refills: 1 | Status: SHIPPED | OUTPATIENT
Start: 2025-01-05 | End: 2025-03-06

## 2025-03-10 ENCOUNTER — OFFICE VISIT (OUTPATIENT)
Dept: PRIMARY CARE | Facility: CLINIC | Age: 60
End: 2025-03-10
Payer: COMMERCIAL

## 2025-03-10 ENCOUNTER — TELEPHONE (OUTPATIENT)
Dept: PRIMARY CARE | Facility: CLINIC | Age: 60
End: 2025-03-10

## 2025-03-10 VITALS
HEART RATE: 70 BPM | WEIGHT: 164 LBS | SYSTOLIC BLOOD PRESSURE: 124 MMHG | HEIGHT: 64 IN | OXYGEN SATURATION: 98 % | TEMPERATURE: 98.2 F | DIASTOLIC BLOOD PRESSURE: 70 MMHG | BODY MASS INDEX: 28 KG/M2 | RESPIRATION RATE: 16 BRPM

## 2025-03-10 DIAGNOSIS — R30.0 BURNING WITH URINATION: ICD-10-CM

## 2025-03-10 DIAGNOSIS — E78.2 MIXED HYPERLIPIDEMIA: ICD-10-CM

## 2025-03-10 DIAGNOSIS — R35.0 FREQUENCY OF URINATION: ICD-10-CM

## 2025-03-10 DIAGNOSIS — R39.9 UTI SYMPTOMS: Primary | ICD-10-CM

## 2025-03-10 LAB
POC APPEARANCE, URINE: ABNORMAL
POC BILIRUBIN, URINE: NEGATIVE
POC BLOOD, URINE: ABNORMAL
POC COLOR, URINE: ABNORMAL
POC GLUCOSE, URINE: NEGATIVE MG/DL
POC KETONES, URINE: NEGATIVE MG/DL
POC LEUKOCYTES, URINE: ABNORMAL
POC NITRITE,URINE: NEGATIVE
POC PH, URINE: 7 PH
POC PROTEIN, URINE: ABNORMAL MG/DL
POC SPECIFIC GRAVITY, URINE: 1.01
POC UROBILINOGEN, URINE: 0.2 EU/DL

## 2025-03-10 PROCEDURE — 81002 URINALYSIS NONAUTO W/O SCOPE: CPT | Performed by: NURSE PRACTITIONER

## 2025-03-10 PROCEDURE — 99212 OFFICE O/P EST SF 10 MIN: CPT | Performed by: NURSE PRACTITIONER

## 2025-03-10 RX ORDER — SULFAMETHOXAZOLE AND TRIMETHOPRIM 800; 160 MG/1; MG/1
1 TABLET ORAL 2 TIMES DAILY
Qty: 14 TABLET | Refills: 0 | Status: SHIPPED | OUTPATIENT
Start: 2025-03-10 | End: 2025-03-17

## 2025-03-10 ASSESSMENT — ENCOUNTER SYMPTOMS
DEPRESSION: 0
LOSS OF SENSATION IN FEET: 0
OCCASIONAL FEELINGS OF UNSTEADINESS: 0

## 2025-03-10 ASSESSMENT — PATIENT HEALTH QUESTIONNAIRE - PHQ9
2. FEELING DOWN, DEPRESSED OR HOPELESS: NOT AT ALL
SUM OF ALL RESPONSES TO PHQ9 QUESTIONS 1 AND 2: 0
1. LITTLE INTEREST OR PLEASURE IN DOING THINGS: NOT AT ALL

## 2025-03-10 ASSESSMENT — PAIN SCALES - GENERAL: PAINLEVEL_OUTOF10: 4

## 2025-03-10 NOTE — PROGRESS NOTES
Subjective   Patient ID: Melissa Chung is a 59 y.o. female who is with complaint of symptoms of UTI.    HPI   Patient is a 59 y.o. female who CONSULTED AT [facility] today. Patient is with complaints of burning sensation on urination, increased urinary frequency, urgency of urination, sensation of inadequate emptying post voiding, lower abdominal discomfort (dysuria), left sided lower back pain, and diarrhea (1 x). She denies having any nocturia, flank pain, incontinence, cloudy urine, blood in urine, nausea, vomiting, chills, nor fever. Patient states symptoms has been going on for 3 days. Patient has not taken any medication for relief of symptoms. `    Review of Systems  General: no weight loss, generally healthy, no fatigue  Head:  no headaches / sinus pain, no vertigo, no injury  Eyes: no diplopia, no tearing, no pain,   Ears: no change in hearing, no tinnitus, no bleeding, no vertigo  Mouth:  no dental difficulties, no gingival bleeding, no sore throat, no loss of sense of taste  Nose: no congestion, no  discharge, no bleeding, no obstruction, no loss of sense of smell  Neck: no stiffness, no pain, no tenderness, no masses, no bruit  Pulmonary: no dyspnea, no wheezing, no hemoptysis, no cough  Cardiovascular: no chest pain, no palpitations, no syncope, no orthopnea  Gastrointestinal: no change in appetite, no dysphagia, no abdominal pains, (+) diarrhea x 1, no emesis, no melena  Genito Urinary: (+) burning sensation on urination, (+) increased urinary frequency, (+) urgency of urination, (+) sensation of inadequate emptying post voiding, (+) lower abdominal discomfort (dysuria), (+) left sided lower back pain, no nocturia, no flank pain, no incontinence, no cloudy urine, no blood in urine,  Musculoskeletal: no muscle ache, no joint pain, no limitation of range of motion, no paresthesia, no numbness  Constitutional: no fever, no chills, no night sweats    Objective   /70 (BP  "Location: Left arm, Patient Position: Sitting, BP Cuff Size: Adult)   Pulse 70   Temp 36.8 °C (98.2 °F) (Temporal)   Resp 16   Ht 1.626 m (5' 4\")   Wt 74.4 kg (164 lb)   SpO2 98%   BMI 28.15 kg/m²     Physical Exam  General: ambulatory, in no acute distress  Head: normocephalic, no lesions  Eyes: pink palpebral conjunctiva, anicteric sclerae, PERRLA, EOM's full  Ears: clear external auditory canals, no ear discharge, no bleeding from the ears, tympanic membrane intact  Nose: nasal mucosa normal, no nasal discharge, no bleeding, no obstruction  Throat: clear, no exudate, no lesions  Neck: supple, no masses, no bruits  Chest: symmetrical chest expansion, no lagging, no retractions, clear breath sounds, no rales, no wheezes  Heart: normal rate, regular rhythm, no heaves, no thrills, no murmurs  Abdomen: flat, NABS, soft, no direct tenderness, no rebound tenderness, no mass palpated, SIGNS: no [place], no Rovsings, no Psoas, no Obturator; No CVA tenderness,    Assessment/Plan   Problem List Items Addressed This Visit    None  Visit Diagnoses         Codes    UTI symptoms    -  Primary R39.9    Relevant Medications    sulfamethoxazole-trimethoprim (Bactrim DS) 800-160 mg tablet    Other Relevant Orders    POCT UA (nonautomated) manually resulted (Completed)    Urine Culture    Burning with urination     R30.0    Relevant Medications    sulfamethoxazole-trimethoprim (Bactrim DS) 800-160 mg tablet    Other Relevant Orders    POCT UA (nonautomated) manually resulted (Completed)    Urine Culture    Frequency of urination     R35.0    Relevant Medications    sulfamethoxazole-trimethoprim (Bactrim DS) 800-160 mg tablet    Other Relevant Orders    POCT UA (nonautomated) manually resulted (Completed)    Urine Culture        Urinalysis was done at the office today. Urinalysis result explained and discussed with patient. Urine sample submitted to laboratory for culture and sensitivity study. The laboratory examination " requested were explained and discussed with patient.    DISCHARGE SUMMARY:   Patient seen and examined. Probable diagnosis, differential diagnosis, treatment, treatment options, and probable complications were discussed and explained to patient. she was to take medication/s associated with this visit. she may take over-the-counter pain and/or fever medication if needed. Advised increased oral fluid intake (2 liters of water or more per day). Reinforced to continue personal hygiene. Patient to return to clinic if there is worsening or persistence of symptoms. Patient verbalized understanding.    Patient to come back in 7 - 10 days if needed for worsening symptoms.

## 2025-03-10 NOTE — TELEPHONE ENCOUNTER
Dr. Orosco Pt    Refill for  ezetimibe (Zetia) 10 mg tablet    [id number] - [place], [place] - [address]

## 2025-03-11 RX ORDER — EZETIMIBE 10 MG/1
10 TABLET ORAL DAILY
Qty: 90 TABLET | Refills: 1 | Status: SHIPPED | OUTPATIENT
Start: 2025-03-11

## 2025-03-12 LAB — BACTERIA UR CULT: ABNORMAL

## 2025-03-13 ENCOUNTER — DOCUMENTATION (OUTPATIENT)
Dept: PRIMARY CARE | Facility: CLINIC | Age: 60
End: 2025-03-13
Payer: COMMERCIAL

## 2025-06-23 DIAGNOSIS — E78.2 MIXED HYPERLIPIDEMIA: ICD-10-CM

## 2025-06-23 NOTE — TELEPHONE ENCOUNTER
[id number] - [place], [place] - [address]   Pt needs refill on  levothyroxine (Synthroid, Levoxyl) 75 mcg tablet

## 2025-06-25 RX ORDER — LEVOTHYROXINE SODIUM 75 UG/1
75 TABLET ORAL DAILY
Qty: 90 TABLET | Refills: 0 | Status: SHIPPED | OUTPATIENT
Start: 2025-06-25

## 2025-09-23 ENCOUNTER — APPOINTMENT (OUTPATIENT)
Dept: PRIMARY CARE | Facility: CLINIC | Age: 60
End: 2025-09-23
Payer: COMMERCIAL